# Patient Record
Sex: MALE | URBAN - METROPOLITAN AREA
[De-identification: names, ages, dates, MRNs, and addresses within clinical notes are randomized per-mention and may not be internally consistent; named-entity substitution may affect disease eponyms.]

---

## 2023-01-27 ENCOUNTER — APPOINTMENT (RX ONLY)
Dept: URBAN - METROPOLITAN AREA CLINIC 87 | Facility: CLINIC | Age: 63
Setting detail: DERMATOLOGY
End: 2023-01-27

## 2023-01-27 VITALS — SYSTOLIC BLOOD PRESSURE: 163 MMHG | DIASTOLIC BLOOD PRESSURE: 74 MMHG

## 2023-01-27 PROBLEM — D04.4 CARCINOMA IN SITU OF SKIN OF SCALP AND NECK: Status: ACTIVE | Noted: 2023-01-27

## 2023-01-27 PROCEDURE — ? REPAIR NOTE

## 2023-01-27 PROCEDURE — ? MOHS SURGERY

## 2023-01-27 PROCEDURE — 14021 TIS TRNFR S/A/L 10.1-30 SQCM: CPT

## 2023-01-27 PROCEDURE — 17311 MOHS 1 STAGE H/N/HF/G: CPT

## 2023-01-27 NOTE — PROCEDURE: MOHS SURGERY
intermittent O-Z Flap Text: The defect edges were debeveled with a #15 scalpel blade.  Given the location of the defect, shape of the defect and the proximity to free margins an O-Z flap was deemed most appropriate.  Using a sterile surgical marker, an appropriate transposition flap was drawn incorporating the defect and placing the expected incisions within the relaxed skin tension lines where possible. The area thus outlined was incised deep to adipose tissue with a #15 scalpel blade.  The skin margins were undermined to an appropriate distance in all directions utilizing iris scissors.

## 2023-01-27 NOTE — PROCEDURE: MOHS SURGERY
Referred To Mid-Level For Closure Text (Leave Blank If You Do Not Want): After obtaining clear surgical margins the patient was sent to Richard Rivera PA-C for surgical repair.  The patient understands they will receive post-surgical care and follow-up from the mid-level provider.

## 2023-01-27 NOTE — PROCEDURE: REPAIR NOTE
[facility] called to provide room number for scheduled admit for Tikosyn loading therapy. Patient is assigned room [phone] on 3N. Did call patient to advise of room number, did ask patient to stop at main desk on first floor upon arrival to [facility] to be checked in prior to going to assigned room. Patient voiced understanding. Did ask patient to call this office with any questions or concerns. Patient again voiced understanding. Keystone Flap Text: The defect edges were debeveled with a #15 scalpel blade.  Given the location of the defect, shape of the defect a keystone flap was deemed most appropriate.  Using a sterile surgical marker, an appropriate keystone flap was drawn incorporating the defect, outlining the appropriate donor tissue and placing the expected incisions within the relaxed skin tension lines where possible. The area thus outlined was incised deep to adipose tissue with a #15 scalpel blade.  The skin margins were undermined to an appropriate distance in all directions around the primary defect and laterally outward around the flap utilizing iris scissors.

## 2023-01-27 NOTE — PROCEDURE: MOHS SURGERY
Body Location Override (Optional - Billing Will Still Be Based On Selected Body Map Location If Applicable): left superior postauricular skin and left inferior postauricular skin

## 2023-01-27 NOTE — PROCEDURE: MOHS SURGERY
Referred To Plastics For Closure Text (Leave Blank If You Do Not Want): After obtaining clear surgical margins the patient was sent to plastics for surgical repair.  The patient understands they will receive post-surgical care and follow-up from Dr. Syed.

## 2023-01-27 NOTE — PROCEDURE: MOHS SURGERY
Skin Complaint HPI





- HPI Summary


HPI Summary: 





PATIENT WAS DOING SOME WORK ON HIS HOUSE SEVERAL DAYS AGO WHEN HE GOT CAUGHT ON 

A CRISTAL NAIL AND SUSTAINED A SCRATCH AND PUNCTURE WOUND TO HIS RIGHT HAND.  HAS 

BEEN BANDAGING AND KEEPING THE WOUNDS CLEAN AND THEY ARE HEALING WELL.  IS HERE 

REQUESTING A TDAP BOOSTER.





- History of Current Complaint


Chief Complaint: UCWounds


Time Seen by Provider: 09/19/18 07:35


Stated Complaint: PUNCTURE WOUND TO FINGER


Hx Obtained From: Patient


Onset/Duration: Sudden Onset


Timing: Constant


Onset Severity: Mild


Current Severity: None


Pain Intensity: 0


Pain Scale Used: 0-10 Numeric


Aggravating Factor(s): Nothing


Alleviating Factor(s): Nothing





- Allergy/Home Medications


Allergies/Adverse Reactions: 


 Allergies











Allergy/AdvReac Type Severity Reaction Status Date / Time


 


No Known Allergies Allergy   Verified 09/19/18 07:25














Review of Systems


Constitutional: Negative


Skin: Other - PUNCTURE WOUND, ABRASION


Respiratory: Negative


Cardiovascular: Negative


Gastrointestinal: Negative


All Other Systems Reviewed And Are Negative: Yes





PMH/Surg Hx/FS Hx/Imm Hx


Previously Healthy: Yes





- Surgical History


Surgical History: Yes


Surgery Procedure, Year, and Place: LEFT CARPAL TUNNEL RELEASE-1975.  2014 LEFT 

WRIST GANGLION CYST EXCISION, [facility].  DENTAL IMPLANTS





- Family History


Known Family History: 


   Negative: Hypertension





- Social History


Alcohol Use: Weekly


Alcohol Amount: 2-3 TIMES A WEEK


Substance Use Type: None


Smoking Status (MU): Never Smoked Tobacco





- Immunization History


Most Recent Tetanus Shot: needs one





Physical Exam


Triage Information Reviewed: Yes


Appearance: Well-Appearing, No Pain Distress, Well-Nourished


Vital Signs: 


 Initial Vital Signs











Temp  97.8 F   09/19/18 07:25


 


Pulse  63   09/19/18 07:25


 


Resp  16   09/19/18 07:25


 


BP  162/87   09/19/18 07:25


 


Pulse Ox  98   09/19/18 07:25











Vital Signs Reviewed: Yes


Eyes: Positive: Conjunctiva Clear


ENT: Positive: Hearing grossly normal


Neck: Positive: Supple


Respiratory: Positive: No respiratory distress, No accessory muscle use


Cardiovascular: Positive: Pulses Normal


Abdomen Description: Positive: Soft


Musculoskeletal: Positive: No Edema


Neurological: Positive: Alert


Psychological: Positive: Age Appropriate Behavior


Skin: Positive: Other - SPFL ABRASION RIGHT PALM. PUNCTURE WOUND RIGHT INDEX 

FINGER VOLAR ASPECT.





Course/Dx





- Diagnoses


Provider Diagnoses: 1. PUNCTURE WOUND.  2. TDAP BOOSTER





Discharge





- Sign-Out/Discharge


Documenting (check all that apply): Patient Departure


All imaging exams completed and their final reports reviewed: No Studies





- Discharge Plan


Condition: Stable


Disposition: HOME


Patient Education Materials:  Puncture Wound (ED)


Referrals: 


Nydia Shearer MD [Primary Care Provider] - 


Additional Instructions: 


CONTINUE WOUND CARE AS YOU HAVE BEEN DOING. SEEK FOLLOW-UP IF YOU DEVELOP 

SPREADING REDNESS OF THE SKIN, PURULENT DRAINAGE, FEVER, INCREASED PAIN OR ANY 

OTHER CONCERNING SYMPTOMS.





TETANUS IMMUNIZATION GIVEN (TDAP):


     You have been given an immunization against tetanus.  Please record this 

in your records.  In general, a booster is needed only once every 10 years.  

The tetanus shot protects against tetanus or "lockjaw," which is a complication 

of certain wound infections (the tetanus shot cannot protect against the actual 

infection).


     The immunization site may become warm and red due to local reaction.  If 

this occurs, apply warm compresses and take aspirin or ibuprofen to reduce 

inflammation and discomfort.  Return for evaluation if the reaction becomes 

severe.





YOUR BLOOD PRESSURE WAS ELEVATED TODAY (162/87). THIS MAY BE DUE TO YOUR ACUTE 

CONDITION. MONITOR AND FOLLOW-UP WITH YOUR PCP WITHIN 4 WEEKS IF IT HAS NOT 

RETURNED TO NORMAL.





- Billing Disposition and Condition


Condition: STABLE


Disposition: Home Hemostasis: Electrocautery

## 2023-06-29 NOTE — PROCEDURE: MOHS SURGERY
Patient is on hold for activity, off the floor for a procedure. Cardiac Rehabilitation . Will follow to progress.   Mohs Rapid Report Verbiage: The area of clinically evident tumor was marked with skin marking ink and appropriately hatched.  The initial incision was made following the Mohs approach through the skin.  The specimen was taken to the lab, divided into the necessary number of pieces, chromacoded and processed according to the Mohs protocol.  This was repeated in successive stages until a tumor free defect was achieved.

## 2025-06-02 NOTE — PROCEDURE: MOHS SURGERY
Clear bilaterally, pupils equal, round and reactive to light. Bcc Infiltrative Histology Text: There were numerous aggregates of basaloid cells demonstrating an infiltrative pattern.